# Patient Record
Sex: FEMALE | Race: WHITE | NOT HISPANIC OR LATINO | Employment: FULL TIME | URBAN - METROPOLITAN AREA
[De-identification: names, ages, dates, MRNs, and addresses within clinical notes are randomized per-mention and may not be internally consistent; named-entity substitution may affect disease eponyms.]

---

## 2023-12-25 ENCOUNTER — HOSPITAL ENCOUNTER (EMERGENCY)
Facility: HOSPITAL | Age: 38
Discharge: HOME | End: 2023-12-25
Attending: STUDENT IN AN ORGANIZED HEALTH CARE EDUCATION/TRAINING PROGRAM
Payer: COMMERCIAL

## 2023-12-25 VITALS
BODY MASS INDEX: 21.37 KG/M2 | HEART RATE: 78 BPM | TEMPERATURE: 97.3 F | OXYGEN SATURATION: 96 % | SYSTOLIC BLOOD PRESSURE: 114 MMHG | RESPIRATION RATE: 18 BRPM | WEIGHT: 141 LBS | HEIGHT: 68 IN | DIASTOLIC BLOOD PRESSURE: 67 MMHG

## 2023-12-25 DIAGNOSIS — S61.412A LACERATION OF LEFT HAND: Primary | ICD-10-CM

## 2023-12-25 PROCEDURE — 12001 RPR S/N/AX/GEN/TRNK 2.5CM/<: CPT | Performed by: PHYSICIAN ASSISTANT

## 2023-12-25 PROCEDURE — NC001 PR NO CHARGE: Performed by: PHYSICIAN ASSISTANT

## 2023-12-25 PROCEDURE — 99282 EMERGENCY DEPT VISIT SF MDM: CPT

## 2023-12-25 PROCEDURE — 99284 EMERGENCY DEPT VISIT MOD MDM: CPT | Performed by: PHYSICIAN ASSISTANT

## 2023-12-25 RX ORDER — GINSENG 100 MG
1 CAPSULE ORAL ONCE
Status: COMPLETED | OUTPATIENT
Start: 2023-12-25 | End: 2023-12-25

## 2023-12-25 RX ORDER — LIDOCAINE HYDROCHLORIDE AND EPINEPHRINE 10; 10 MG/ML; UG/ML
5 INJECTION, SOLUTION INFILTRATION; PERINEURAL ONCE
Status: COMPLETED | OUTPATIENT
Start: 2023-12-25 | End: 2023-12-25

## 2023-12-25 RX ADMIN — LIDOCAINE HYDROCHLORIDE,EPINEPHRINE BITARTRATE 5 ML: 10; .01 INJECTION, SOLUTION INFILTRATION; PERINEURAL at 10:10

## 2023-12-25 RX ADMIN — BACITRACIN 1 SMALL APPLICATION: 500 OINTMENT TOPICAL at 10:58

## 2023-12-25 NOTE — ED PROVIDER NOTES
History  Chief Complaint   Patient presents with    Hand Laceration     Pt states she accidentally cut her knuckles on left hand.       39yo right hand dominant female presenting for evaluation of a left hand laceration that was sustained less an hour ago. Patient was cutting a turkey with a knife when she accidentally cut herself on the dorsum of her left hand. Bleeding controlled on arrival. No paresthesias. Last Tdap was a few months ago per patient.       History provided by:  Patient   used: No        None       History reviewed. No pertinent past medical history.    History reviewed. No pertinent surgical history.    History reviewed. No pertinent family history.  I have reviewed and agree with the history as documented.    E-Cigarette/Vaping     E-Cigarette/Vaping Substances     Social History     Tobacco Use    Smoking status: Every Day     Current packs/day: 0.50     Types: Cigarettes    Smokeless tobacco: Never       Review of Systems   Constitutional:  Negative for chills and fever.   Eyes:  Negative for discharge and redness.   Musculoskeletal:  Negative for arthralgias.   Skin:  Positive for wound.   Neurological:  Negative for weakness and numbness.   Psychiatric/Behavioral:  Negative for confusion. The patient is not nervous/anxious.    All other systems reviewed and are negative.      Physical Exam  Physical Exam  Vitals and nursing note reviewed.   Constitutional:       General: She is not in acute distress.     Appearance: Normal appearance. She is not toxic-appearing.   HENT:      Head: Normocephalic and atraumatic.      Right Ear: External ear normal.      Left Ear: External ear normal.   Eyes:      General: No scleral icterus.        Right eye: No discharge.         Left eye: No discharge.      Conjunctiva/sclera: Conjunctivae normal.   Cardiovascular:      Rate and Rhythm: Normal rate.   Pulmonary:      Effort: Pulmonary effort is normal. No respiratory distress.      Breath  sounds: No stridor.   Musculoskeletal:         General: No deformity. Normal range of motion.        Hands:       Cervical back: Normal range of motion.      Comments: No bony tenderness. ROM of DIP, PIP, and MCP joints intact. Sensation and cap refill intact.    Skin:     General: Skin is warm and dry.   Neurological:      General: No focal deficit present.      Mental Status: She is alert. Mental status is at baseline.   Psychiatric:         Mood and Affect: Mood normal.         Behavior: Behavior normal.         Vital Signs  ED Triage Vitals [12/25/23 0953]   Temperature Pulse Respirations Blood Pressure SpO2   (!) 97.3 °F (36.3 °C) 78 18 114/67 96 %      Temp Source Heart Rate Source Patient Position - Orthostatic VS BP Location FiO2 (%)   Temporal Monitor Sitting Left arm --      Pain Score       --           Vitals:    12/25/23 0953   BP: 114/67   Pulse: 78   Patient Position - Orthostatic VS: Sitting         Visual Acuity      ED Medications  Medications   lidocaine-epinephrine (XYLOCAINE/EPINEPHRINE) 1 %-1:100,000 injection 5 mL (5 mL Infiltration Given 12/25/23 1010)   bacitracin topical ointment 1 small application (1 small application Topical Given 12/25/23 1058)       Diagnostic Studies  Results Reviewed       None                   No orders to display              Procedures  Universal Protocol:  Consent: Verbal consent obtained.  Risks and benefits: risks, benefits and alternatives were discussed  Consent given by: patient  Patient understanding: patient states understanding of the procedure being performed  Patient identity confirmed: verbally with patient  Laceration repair    Date/Time: 12/25/2023 10:32 AM    Performed by: Lindsay Escoto PA-C  Authorized by: Lindsay Escoto PA-C  Body area: upper extremity  Location details: left hand  Laceration length: 2 cm  Foreign bodies: no foreign bodies  Tendon involvement: none  Nerve involvement: none  Vascular damage: no  Anesthesia: local  infiltration    Anesthesia:  Local Anesthetic: lidocaine 1% with epinephrine      Procedure Details:  Preparation: Patient was prepped and draped in the usual sterile fashion.  Irrigation solution: saline  Irrigation method: jet lavage  Amount of cleaning: standard  Skin closure: 5-0 nylon  Number of sutures: 2  Technique: simple  Approximation: close  Approximation difficulty: simple  Dressing: antibiotic ointment and gauze roll  Patient tolerance: patient tolerated the procedure well with no immediate complications               ED Course                     Medical Decision Making  38yoF here with a L hand laceration. Tdap up to date. 2cm laceration to the dorsum of the hand noted on exam. No tendon involvement and ROM intact. Wound was cleaned and repaired as above. Home wound care discussed. Advised return to the ED in 10-14 days for suture removal or sooner with any signs of infection.     Problems Addressed:  Laceration of left hand: acute illness or injury    Risk  OTC drugs.  Prescription drug management.             Disposition  Final diagnoses:   Laceration of left hand     Time reflects when diagnosis was documented in both MDM as applicable and the Disposition within this note       Time User Action Codes Description Comment    12/25/2023 10:30 AM Lindsay Ecsoto Add [S61.412A] Laceration of left hand           ED Disposition       ED Disposition   Discharge    Condition   Stable    Date/Time   Mon Dec 25, 2023 10:30 AM    Comment   Alanna Omollo discharge to home/self care.                   Follow-up Information       Follow up With Specialties Details Why Contact Info Additional Information    [facility] Emergency Department Emergency Medicine In 10 days For suture removal [address] [facility] [id number]  [phone] [facility] Emergency Department, [address] [place], [place], [address]            There are no discharge  medications for this patient.      No discharge procedures on file.    PDMP Review       None            ED Provider  Electronically Signed by             Lindsay Escoto PA-C  12/26/23 7220

## 2023-12-25 NOTE — DISCHARGE INSTRUCTIONS
Change dressings daily.    Return to the ER in 10-14 days for suture removal or sooner with any signs of infection.

## 2024-10-11 ENCOUNTER — HOSPITAL ENCOUNTER (EMERGENCY)
Facility: HOSPITAL | Age: 39
Discharge: HOME | End: 2024-10-11

## 2024-10-11 VITALS
TEMPERATURE: 97.6 F | SYSTOLIC BLOOD PRESSURE: 107 MMHG | OXYGEN SATURATION: 98 % | HEART RATE: 102 BPM | DIASTOLIC BLOOD PRESSURE: 71 MMHG | RESPIRATION RATE: 20 BRPM

## 2024-10-11 DIAGNOSIS — S61.219A FINGER LACERATION: Primary | ICD-10-CM

## 2024-10-11 PROCEDURE — 99283 EMERGENCY DEPT VISIT LOW MDM: CPT

## 2024-10-11 PROCEDURE — 99282 EMERGENCY DEPT VISIT SF MDM: CPT

## 2024-10-11 NOTE — ED PROVIDER NOTES
Time reflects when diagnosis was documented in both MDM as applicable and the Disposition within this note       Time User Action Codes Description Comment    10/11/2024  1:47 PM Maranda Cardoza Add [S61.219A] Finger laceration           ED Disposition       ED Disposition   Discharge    Condition   Stable    Date/Time   Fri Oct 11, 2024  1:47 PM    Comment   Alanna Dinh discharge to home/self care.                   Assessment & Plan       Medical Decision Making  Wound inspected under direct bright light with good visualization.  Area with linear laceration across soft tissue without exposure of muscle belly or tendon. Wound is not deep enough for laceration repair.  No overt foreign body.  Area hemostatic.  Area irrigated with sterile normal saline under pressure.   Cautious return precautions discussed with full understanding.  Wound care discussed.  Prompt follow-up with primary care provider. Return to ER if signs of infection.               Medications - No data to display    ED Risk Strat Scores                           SBIRT 22yo+      Flowsheet Row Most Recent Value   Initial Alcohol Screen: US AUDIT-C     1. How often do you have a drink containing alcohol? 0 Filed at: 10/11/2024 1258   2. How many drinks containing alcohol do you have on a typical day you are drinking?  0 Filed at: 10/11/2024 1258   3a. Male UNDER 65: How often do you have five or more drinks on one occasion? 0 Filed at: 10/11/2024 1258   3b. FEMALE Any Age, or MALE 65+: How often do you have 4 or more drinks on one occassion? 0 Filed at: 10/11/2024 1258   Audit-C Score 0 Filed at: 10/11/2024 1258   CYNTHIA: How many times in the past year have you...    Used an illegal drug or used a prescription medication for non-medical reasons? Never Filed at: 10/11/2024 1258                            History of Present Illness       Chief Complaint   Patient presents with    Hand Laceration     Pt presents to the ED with a L thumb laceration. Pt states  she cut her thumb while dicing up peppers at home.        No past medical history on file.   No past surgical history on file.   No family history on file.   Social History     Tobacco Use    Smoking status: Every Day     Current packs/day: 0.50     Types: Cigarettes    Smokeless tobacco: Never      E-Cigarette/Vaping      E-Cigarette/Vaping Substances      I have reviewed and agree with the history as documented.     47-year-old female patient presents the ER for evaluation of laceration.  Patient stated that she was cutting up an onion when she slipped and cut her left thumb.  Wound is hemostatic.  Patient's tetanus is up-to-date.  Full range of motion noted.  Neurovascularly intact, sensation equal bilaterally.  Patient is right-hand dominant.        Review of Systems   Constitutional:  Negative for chills and fever.   HENT:  Negative for ear pain and sore throat.    Eyes:  Negative for pain and visual disturbance.   Respiratory:  Negative for cough and shortness of breath.    Cardiovascular:  Negative for chest pain and palpitations.   Gastrointestinal:  Negative for abdominal pain and vomiting.   Genitourinary:  Negative for dysuria and hematuria.   Musculoskeletal:  Negative for arthralgias and back pain.   Skin:  Positive for wound. Negative for color change and rash.   Neurological:  Negative for seizures and syncope.   All other systems reviewed and are negative.          Objective       ED Triage Vitals [10/11/24 1256]   Temperature Pulse Blood Pressure Respirations SpO2 Patient Position - Orthostatic VS   97.6 °F (36.4 °C) 102 107/71 20 98 % Sitting      Temp Source Heart Rate Source BP Location FiO2 (%) Pain Score    Temporal Monitor Left arm -- --      Vitals      Date and Time Temp Pulse SpO2 Resp BP Pain Score FACES Pain Rating User   10/11/24 1256 97.6 °F (36.4 °C) 102 98 % 20 107/71 -- -- GP            Physical Exam  Vitals and nursing note reviewed.   Constitutional:       General: She is not in  acute distress.     Appearance: She is well-developed.   HENT:      Head: Normocephalic and atraumatic.   Eyes:      Conjunctiva/sclera: Conjunctivae normal.   Cardiovascular:      Rate and Rhythm: Normal rate and regular rhythm.      Heart sounds: No murmur heard.  Pulmonary:      Effort: Pulmonary effort is normal. No respiratory distress.      Breath sounds: Normal breath sounds.   Abdominal:      Palpations: Abdomen is soft.      Tenderness: There is no abdominal tenderness.   Musculoskeletal:         General: No swelling.        Hands:       Cervical back: Neck supple.   Skin:     General: Skin is warm and dry.      Capillary Refill: Capillary refill takes less than 2 seconds.   Neurological:      Mental Status: She is alert.   Psychiatric:         Mood and Affect: Mood normal.         Results Reviewed       None            No orders to display       Procedures    ED Medication and Procedure Management   None     Patient's Medications    No medications on file     No discharge procedures on file.  ED SEPSIS DOCUMENTATION   Time reflects when diagnosis was documented in both MDM as applicable and the Disposition within this note       Time User Action Codes Description Comment    10/11/2024  1:47 PM Maranda Cardoza Add [S61.219A] Finger laceration                  BETTIE Alvarez  10/11/24 0137

## 2024-10-11 NOTE — DISCHARGE INSTRUCTIONS
Keep wound clean and dry  Change dressing daily  Steri-Strips will fall off within 3 to 5 days  Return to the ER if signs of infection